# Patient Record
Sex: MALE | Race: WHITE | NOT HISPANIC OR LATINO | Employment: FULL TIME | URBAN - NONMETROPOLITAN AREA
[De-identification: names, ages, dates, MRNs, and addresses within clinical notes are randomized per-mention and may not be internally consistent; named-entity substitution may affect disease eponyms.]

---

## 2023-03-29 PROBLEM — B35.6 TINEA CRURIS: Status: ACTIVE | Noted: 2023-03-29

## 2023-03-29 PROBLEM — L72.3 SEBACEOUS CYST: Status: ACTIVE | Noted: 2023-03-29

## 2023-03-29 PROBLEM — L60.0 INGROWN RIGHT BIG TOENAIL: Status: ACTIVE | Noted: 2023-03-29

## 2023-03-31 ENCOUNTER — OFFICE VISIT (OUTPATIENT)
Dept: PRIMARY CARE | Facility: CLINIC | Age: 33
End: 2023-03-31
Payer: COMMERCIAL

## 2023-03-31 VITALS
SYSTOLIC BLOOD PRESSURE: 136 MMHG | OXYGEN SATURATION: 98 % | HEART RATE: 80 BPM | BODY MASS INDEX: 26.98 KG/M2 | HEIGHT: 75 IN | WEIGHT: 217 LBS | DIASTOLIC BLOOD PRESSURE: 84 MMHG

## 2023-03-31 DIAGNOSIS — R10.13 DYSPEPSIA: ICD-10-CM

## 2023-03-31 DIAGNOSIS — B07.8 COMMON WART: Primary | ICD-10-CM

## 2023-03-31 PROBLEM — B35.6 TINEA CRURIS: Status: RESOLVED | Noted: 2023-03-29 | Resolved: 2023-03-31

## 2023-03-31 PROBLEM — L60.0 INGROWN RIGHT BIG TOENAIL: Status: RESOLVED | Noted: 2023-03-29 | Resolved: 2023-03-31

## 2023-03-31 PROBLEM — L72.3 SEBACEOUS CYST: Status: RESOLVED | Noted: 2023-03-29 | Resolved: 2023-03-31

## 2023-03-31 PROCEDURE — 17110 DESTRUCTION B9 LES UP TO 14: CPT | Performed by: FAMILY MEDICINE

## 2023-03-31 PROCEDURE — 99214 OFFICE O/P EST MOD 30 MIN: CPT | Performed by: FAMILY MEDICINE

## 2023-03-31 RX ORDER — SIMETHICONE 125 MG
TABLET,CHEWABLE ORAL EVERY 6 HOURS PRN
COMMUNITY
End: 2023-10-19 | Stop reason: WASHOUT

## 2023-03-31 RX ORDER — PANTOPRAZOLE SODIUM 40 MG/1
40 TABLET, DELAYED RELEASE ORAL DAILY
Qty: 30 TABLET | Refills: 5 | Status: SHIPPED | OUTPATIENT
Start: 2023-03-31 | End: 2023-10-19 | Stop reason: SDUPTHER

## 2023-03-31 NOTE — PROGRESS NOTES
"Subjective   Patient ID: Nikolay Ames is a 33 y.o. male who presents for GERD (X 2 weeks. States he feels his throat has been swelling and pressure in the chest) and Toe Pain (R pinky toe- warts).    HPI has developed warts on the right foot plantar fifth digit with aggressive home care persisting    Dyspepsia for the last month has had soreness in the throat a lot of eructation and regurgitation.  No dysphagia odynophagia.  No weight loss no blood.  Is known to have some allergies with postnasal drip.  Weekend alcohol in the 6-10 range.  Oral nicotine use.  No nonsteroidals.  Large ice coffee daily.    Review of Systems  Negative except as per HPI  Objective   /84 (BP Location: Right arm, Patient Position: Sitting)   Pulse 80   Ht 1.905 m (6' 3\")   Wt 98.4 kg (217 lb)   SpO2 98%   BMI 27.12 kg/m²     Physical Exam  Neck no bruit thyroid nontender heart regular without murmur lungs clear.  Abdomen soft no solid or pulsatile masses mild epigastric tenderness.  Right foot fifth digit 1-1/2 cm cluster of warts on the plantar pad of the fifth digit and 3 warts on the adjoining metatarsal pad. Liquid nitrogen was applied to the lesion to effect a 2-3 mm margin of cryodestruction.  Expected course of healing and wound care instructions were provided  and reviewed.    Assessment/Plan   Problem List Items Addressed This Visit    None  Visit Diagnoses       Common wart    -  Primary    Relevant Orders    Follow Up In Primary Care    Dyspepsia        Relevant Medications    pantoprazole (ProtoNix) 40 mg EC tablet    Other Relevant Orders    Follow Up In Primary Care        6-week follow-up to evaluate warts and dyspepsia       "

## 2023-05-12 ENCOUNTER — TELEPHONE (OUTPATIENT)
Dept: PRIMARY CARE | Facility: CLINIC | Age: 33
End: 2023-05-12

## 2023-05-12 ENCOUNTER — OFFICE VISIT (OUTPATIENT)
Dept: PRIMARY CARE | Facility: CLINIC | Age: 33
End: 2023-05-12
Payer: COMMERCIAL

## 2023-05-12 VITALS
DIASTOLIC BLOOD PRESSURE: 80 MMHG | HEIGHT: 75 IN | OXYGEN SATURATION: 98 % | BODY MASS INDEX: 27.09 KG/M2 | WEIGHT: 217.9 LBS | HEART RATE: 63 BPM | SYSTOLIC BLOOD PRESSURE: 110 MMHG

## 2023-05-12 DIAGNOSIS — B07.8 COMMON WART: ICD-10-CM

## 2023-05-12 DIAGNOSIS — R10.13 DYSPEPSIA: ICD-10-CM

## 2023-05-12 PROBLEM — K21.9 GASTROESOPHAGEAL REFLUX DISEASE WITHOUT ESOPHAGITIS: Status: ACTIVE | Noted: 2023-05-12

## 2023-05-12 PROCEDURE — 99213 OFFICE O/P EST LOW 20 MIN: CPT | Performed by: FAMILY MEDICINE

## 2023-05-12 RX ORDER — SUCRALFATE 1 G/1
1 TABLET ORAL
Qty: 56 TABLET | Refills: 0 | Status: SHIPPED | OUTPATIENT
Start: 2023-05-12 | End: 2023-05-26

## 2023-05-12 ASSESSMENT — ENCOUNTER SYMPTOMS
CONSTIPATION: 0
APPETITE CHANGE: 0
SHORTNESS OF BREATH: 0
ABDOMINAL PAIN: 0
PALPITATIONS: 0
ACTIVITY CHANGE: 0
COUGH: 0
SORE THROAT: 1
CHEST TIGHTNESS: 0
VOMITING: 0
FATIGUE: 0
NAUSEA: 0
DIARRHEA: 0

## 2023-05-12 NOTE — PROGRESS NOTES
"Subjective   Patient ID: Nikolay Ames is a 33 y.o. male who presents for 6 wk fu.    HPI   Seen MDS 6 weeks ago for GERD, placed on PPI (has helped), triggers with food and ETOH (better with adjustment), some feeling of dysphagia at times, dry throat and sore throat at times  Some coughing    Review of Systems   Constitutional:  Negative for activity change, appetite change and fatigue.   HENT:  Positive for sore throat.    Respiratory:  Negative for cough, chest tightness and shortness of breath.    Cardiovascular:  Negative for chest pain, palpitations and leg swelling.   Gastrointestinal:  Negative for abdominal pain, constipation, diarrhea, nausea and vomiting.       Objective   /80   Pulse 63   Ht 1.905 m (6' 3\")   Wt 98.8 kg (217 lb 14.4 oz)   SpO2 98%   BMI 27.24 kg/m²     Physical Exam  Vitals and nursing note reviewed.   Constitutional:       Appearance: Normal appearance.   Cardiovascular:      Rate and Rhythm: Normal rate and regular rhythm.      Pulses: Normal pulses.      Heart sounds: Normal heart sounds.   Pulmonary:      Effort: Pulmonary effort is normal.      Breath sounds: Normal breath sounds.   Abdominal:      General: Abdomen is flat. Bowel sounds are normal.      Palpations: Abdomen is soft.   Neurological:      Mental Status: He is alert.   Psychiatric:         Mood and Affect: Mood normal.         Behavior: Behavior normal.         Assessment/Plan   Problem List Items Addressed This Visit    None  Visit Diagnoses       Common wart        Treated with cryo with surgery today, if still persist try Renuka.    Dyspepsia        Relevant Medications    sucralfate (Carafate) 1 gram tablet    Other Relevant Orders    Referral to Gastroenterology               "

## 2023-05-12 NOTE — ASSESSMENT & PLAN NOTE
Patient tolerating PPI with some improvement especially with diet changes, still having some intermittent dyspepsia on medication, and some dysphagia in the throat area.  Placed on Carafate for the next 2 weeks, refer to GI for EGD.

## 2023-06-29 ENCOUNTER — TELEPHONE (OUTPATIENT)
Dept: PRIMARY CARE | Facility: CLINIC | Age: 33
End: 2023-06-29
Payer: COMMERCIAL

## 2023-06-29 NOTE — TELEPHONE ENCOUNTER
PATIENT SAYS YOU MENTIONED TOPICAL MED FOR WARTS ON TOES.    WOULD LIKE TO TRY IT.   FIDEL CASTILLO

## 2023-07-03 DIAGNOSIS — B07.8 COMMON WART: Primary | ICD-10-CM

## 2023-07-03 RX ORDER — IMIQUIMOD 12.5 MG/.25G
1 CREAM TOPICAL 3 TIMES WEEKLY
Qty: 12 PACKET | Refills: 11 | Status: SHIPPED | OUTPATIENT
Start: 2023-07-03 | End: 2023-10-19 | Stop reason: WASHOUT

## 2023-07-25 ENCOUNTER — HOSPITAL ENCOUNTER (OUTPATIENT)
Dept: DATA CONVERSION | Facility: HOSPITAL | Age: 33
End: 2023-07-25
Attending: INTERNAL MEDICINE | Admitting: INTERNAL MEDICINE
Payer: COMMERCIAL

## 2023-07-25 DIAGNOSIS — K22.89 OTHER SPECIFIED DISEASE OF ESOPHAGUS: ICD-10-CM

## 2023-07-25 DIAGNOSIS — R13.10 DYSPHAGIA, UNSPECIFIED: ICD-10-CM

## 2023-07-25 DIAGNOSIS — R13.19 OTHER DYSPHAGIA: ICD-10-CM

## 2023-07-25 DIAGNOSIS — K30 FUNCTIONAL DYSPEPSIA: ICD-10-CM

## 2023-08-01 LAB
COMPLETE PATHOLOGY REPORT: NORMAL
CONVERTED CLINICAL DIAGNOSIS-HISTORY: NORMAL
CONVERTED FINAL DIAGNOSIS: NORMAL
CONVERTED FINAL REPORT PDF LINK TO COPY AND PASTE: NORMAL
CONVERTED GROSS DESCRIPTION: NORMAL

## 2023-10-19 ENCOUNTER — OFFICE VISIT (OUTPATIENT)
Dept: GASTROENTEROLOGY | Facility: CLINIC | Age: 33
End: 2023-10-19
Payer: COMMERCIAL

## 2023-10-19 VITALS — BODY MASS INDEX: 26.24 KG/M2 | WEIGHT: 211 LBS | HEIGHT: 75 IN

## 2023-10-19 DIAGNOSIS — K21.9 GASTROESOPHAGEAL REFLUX DISEASE WITHOUT ESOPHAGITIS: Primary | ICD-10-CM

## 2023-10-19 DIAGNOSIS — R10.13 DYSPEPSIA: ICD-10-CM

## 2023-10-19 PROCEDURE — 99213 OFFICE O/P EST LOW 20 MIN: CPT | Performed by: INTERNAL MEDICINE

## 2023-10-19 RX ORDER — PANTOPRAZOLE SODIUM 40 MG/1
40 TABLET, DELAYED RELEASE ORAL DAILY
Qty: 30 TABLET | Refills: 11 | Status: SHIPPED | OUTPATIENT
Start: 2023-10-19 | End: 2024-03-26 | Stop reason: WASHOUT

## 2023-10-19 ASSESSMENT — ENCOUNTER SYMPTOMS
ROS GI COMMENTS: SEE HPI
TROUBLE SWALLOWING: 0
UNEXPECTED WEIGHT CHANGE: 0
FEVER: 0
CHILLS: 0
ARTHRALGIAS: 0
SHORTNESS OF BREATH: 0
COUGH: 0

## 2023-10-19 NOTE — PROGRESS NOTES
Subjective   Patient ID: Nikolay Ames is a 33 y.o. male who presents for Follow-up (FUV from EGD to discuss poss EOE).  HPI Rik is seen today in follow-up.  Upper endoscopy was performed for symptomatic dysphagia biopsies were unremarkable.  Did feel better while on Prilosec, omeprazole or Nexium recently.  Since stopping therapy has had some stuttering episodes of dysphagia.  Worse if he eats acidic foods or drinks beer.  He denies any other complaints    Review of Systems   Constitutional:  Negative for chills, fever and unexpected weight change.   HENT:  Negative for mouth sores and trouble swallowing.    Respiratory:  Negative for cough and shortness of breath.    Cardiovascular:  Negative for chest pain and leg swelling.   Gastrointestinal:         See HPI   Musculoskeletal:  Negative for arthralgias.   Skin:  Negative for rash.       Objective   Physical Exam  Vitals reviewed.   Constitutional:       General: He is awake.      Appearance: Normal appearance.   HENT:      Head: Normocephalic.      Mouth/Throat:      Mouth: Mucous membranes are moist.      Pharynx: Oropharynx is clear.   Eyes:      Conjunctiva/sclera: Conjunctivae normal.      Pupils: Pupils are equal, round, and reactive to light.   Cardiovascular:      Rate and Rhythm: Normal rate and regular rhythm.      Heart sounds: Normal heart sounds.      Comments: No overt chest pain  Pulmonary:      Effort: Pulmonary effort is normal.      Breath sounds: Normal breath sounds.   Abdominal:      General: Abdomen is flat. Bowel sounds are normal.      Palpations: Abdomen is soft.      Tenderness: There is no abdominal tenderness.   Musculoskeletal:         General: Normal range of motion.      Cervical back: Normal range of motion and neck supple.   Skin:     General: Skin is warm and dry.   Neurological:      Mental Status: He is alert and oriented to person, place, and time.   Psychiatric:         Attention and Perception: Attention and perception  normal.         Behavior: Behavior normal.         Assessment/Plan   Problem List Items Addressed This Visit             ICD-10-CM    Gastroesophageal reflux disease without esophagitis - Primary K21.9    Relevant Orders    Follow Up In Gastroenterology     Other Visit Diagnoses         Codes    Dyspepsia     R10.13    Relevant Medications    pantoprazole (ProtoNix) 40 mg EC tablet    Other Relevant Orders    Follow Up In Gastroenterology

## 2023-12-08 ENCOUNTER — OFFICE VISIT (OUTPATIENT)
Dept: PRIMARY CARE | Facility: CLINIC | Age: 33
End: 2023-12-08
Payer: COMMERCIAL

## 2023-12-08 VITALS
BODY MASS INDEX: 26.3 KG/M2 | DIASTOLIC BLOOD PRESSURE: 80 MMHG | OXYGEN SATURATION: 96 % | HEIGHT: 75 IN | WEIGHT: 211.5 LBS | SYSTOLIC BLOOD PRESSURE: 120 MMHG | HEART RATE: 85 BPM

## 2023-12-08 DIAGNOSIS — B35.6 TINEA CRURIS: Primary | ICD-10-CM

## 2023-12-08 PROCEDURE — 99213 OFFICE O/P EST LOW 20 MIN: CPT | Performed by: FAMILY MEDICINE

## 2023-12-08 RX ORDER — NYSTATIN AND TRIAMCINOLONE ACETONIDE 100000; 1 [USP'U]/G; MG/G
CREAM TOPICAL 2 TIMES DAILY
Qty: 30 G | Refills: 1 | Status: SHIPPED | OUTPATIENT
Start: 2023-12-08 | End: 2024-03-26 | Stop reason: WASHOUT

## 2023-12-08 ASSESSMENT — ENCOUNTER SYMPTOMS
CONSTIPATION: 0
NAUSEA: 0
FATIGUE: 0
DIARRHEA: 0
SHORTNESS OF BREATH: 0
VOMITING: 0
PALPITATIONS: 0
ACTIVITY CHANGE: 0
APPETITE CHANGE: 0
COUGH: 0
ABDOMINAL PAIN: 0
CHEST TIGHTNESS: 0

## 2023-12-08 NOTE — PROGRESS NOTES
"Subjective   Patient ID: Nikolay Ames is a 33 y.o. male who presents for RASH GROIN (X1mo/).    HPI   Rash in groin, some itching, been there for a month, tried OTC miconazole cream (some   Help), no pain  Review of Systems   Constitutional:  Negative for activity change, appetite change and fatigue.   Respiratory:  Negative for cough, chest tightness and shortness of breath.    Cardiovascular:  Negative for chest pain, palpitations and leg swelling.   Gastrointestinal:  Negative for abdominal pain, constipation, diarrhea, nausea and vomiting.   Skin:  Positive for rash.       Objective   /80   Pulse 85   Ht 1.905 m (6' 3\")   Wt 95.9 kg (211 lb 8 oz)   SpO2 96%   BMI 26.44 kg/m²     Physical Exam  Vitals and nursing note reviewed.   Constitutional:       Appearance: Normal appearance. He is normal weight.   Skin:     Comments: Some erythema and macular raised area along the posterior aspect of the scrotum extending to the perineum.  No scaling, no skin breakdown, nontender to palpation.   Neurological:      Mental Status: He is alert.         Assessment/Plan   Problem List Items Addressed This Visit             ICD-10-CM    Tinea cruris - Primary B35.6     Try Mycolog cream for the next 10 days, call if not improving in the next 14 days.         Relevant Medications    nystatin-triamcinolone (Mycolog II) cream          "

## 2024-03-26 ENCOUNTER — OFFICE VISIT (OUTPATIENT)
Dept: PRIMARY CARE | Facility: CLINIC | Age: 34
End: 2024-03-26
Payer: COMMERCIAL

## 2024-03-26 VITALS
WEIGHT: 217 LBS | HEART RATE: 82 BPM | SYSTOLIC BLOOD PRESSURE: 130 MMHG | HEIGHT: 75 IN | DIASTOLIC BLOOD PRESSURE: 90 MMHG | OXYGEN SATURATION: 98 % | BODY MASS INDEX: 26.98 KG/M2

## 2024-03-26 DIAGNOSIS — R36.9 PENILE DISCHARGE: Primary | ICD-10-CM

## 2024-03-26 PROCEDURE — 99213 OFFICE O/P EST LOW 20 MIN: CPT | Performed by: FAMILY MEDICINE

## 2024-03-26 PROCEDURE — 87800 DETECT AGNT MULT DNA DIREC: CPT

## 2024-03-26 RX ORDER — DOXYCYCLINE 100 MG/1
100 CAPSULE ORAL 2 TIMES DAILY
Qty: 14 CAPSULE | Refills: 0 | Status: SHIPPED | OUTPATIENT
Start: 2024-03-26 | End: 2024-04-02

## 2024-03-26 ASSESSMENT — ENCOUNTER SYMPTOMS
PALPITATIONS: 0
CONSTIPATION: 0
SHORTNESS OF BREATH: 0
ABDOMINAL PAIN: 0
VOMITING: 0
CHEST TIGHTNESS: 0
COUGH: 0
ACTIVITY CHANGE: 0
APPETITE CHANGE: 0
DYSURIA: 1
NAUSEA: 0
DIARRHEA: 0
FATIGUE: 0

## 2024-03-26 NOTE — PROGRESS NOTES
"Subjective   Patient ID: Nikolay Ames is a 34 y.o. male who presents for Milky liquid coming from Penis.    HPI   1 week with urge to urinate, 2-3 days with discharge from penis, some urge to urinate, no N/V or F/C  Sexually active, no rash, some redness at urethra.     Review of Systems   Constitutional:  Negative for activity change, appetite change and fatigue.   Respiratory:  Negative for cough, chest tightness and shortness of breath.    Cardiovascular:  Negative for chest pain, palpitations and leg swelling.   Gastrointestinal:  Negative for abdominal pain, constipation, diarrhea, nausea and vomiting.   Genitourinary:  Positive for dysuria, penile discharge and urgency. Negative for penile pain, penile swelling, scrotal swelling and testicular pain.       Objective   /90   Pulse 82   Ht 1.905 m (6' 3\")   Wt 98.4 kg (217 lb)   SpO2 98%   BMI 27.12 kg/m²     Physical Exam  Vitals and nursing note reviewed.   Constitutional:       Appearance: Normal appearance.   HENT:      Head: Normocephalic and atraumatic.      Right Ear: Tympanic membrane, ear canal and external ear normal.      Left Ear: Tympanic membrane, ear canal and external ear normal.      Nose: Nose normal.      Mouth/Throat:      Mouth: Mucous membranes are moist.      Pharynx: Oropharynx is clear.   Cardiovascular:      Rate and Rhythm: Normal rate and regular rhythm.      Pulses: Normal pulses.      Heart sounds: Normal heart sounds.   Pulmonary:      Effort: Pulmonary effort is normal.      Breath sounds: Normal breath sounds.   Genitourinary:     Penis: Normal.       Testes: Normal.   Musculoskeletal:      Cervical back: Normal range of motion and neck supple.   Neurological:      Mental Status: He is alert.   Psychiatric:         Mood and Affect: Mood normal.         Behavior: Behavior normal.         Assessment/Plan   Problem List Items Addressed This Visit    None  Visit Diagnoses         Codes    Penile discharge    -  Primary " R36.9    Check urinalysis for GC and chlamydia, placed on doxycycline 100 mg 1 p.o. twice daily x 7 days.  Patient's partner is already aware of testing needed.     Relevant Medications    doxycycline (Vibramycin) 100 mg capsule    Other Relevant Orders    C. trachomatis / N. gonorrhoeae, DNA probe

## 2024-03-27 LAB
C TRACH RRNA SPEC QL NAA+PROBE: NEGATIVE
N GONORRHOEA DNA SPEC QL PROBE+SIG AMP: NEGATIVE

## 2024-03-28 NOTE — RESULT ENCOUNTER NOTE
Please let the patient know that his tests were negative (no chlamydia or gonorrhea). I'd finish the antibiotics though and if the issue does not resolve, call

## 2024-03-29 ENCOUNTER — TELEPHONE (OUTPATIENT)
Dept: PRIMARY CARE | Facility: CLINIC | Age: 34
End: 2024-03-29
Payer: COMMERCIAL

## 2024-03-29 NOTE — TELEPHONE ENCOUNTER
----- Message from Shade Jones MD sent at 3/28/2024  7:55 AM EDT -----  Please let the patient know that his tests were negative (no chlamydia or gonorrhea). I'd finish the antibiotics though and if the issue does not resolve, call

## 2024-04-25 ENCOUNTER — OFFICE VISIT (OUTPATIENT)
Dept: PRIMARY CARE | Facility: CLINIC | Age: 34
End: 2024-04-25
Payer: COMMERCIAL

## 2024-04-25 VITALS
BODY MASS INDEX: 27.3 KG/M2 | SYSTOLIC BLOOD PRESSURE: 124 MMHG | WEIGHT: 219.6 LBS | HEART RATE: 71 BPM | HEIGHT: 75 IN | OXYGEN SATURATION: 97 % | DIASTOLIC BLOOD PRESSURE: 80 MMHG

## 2024-04-25 DIAGNOSIS — R36.9 PENILE DISCHARGE: ICD-10-CM

## 2024-04-25 LAB
POC APPEARANCE, URINE: CLEAR
POC BILIRUBIN, URINE: NEGATIVE
POC BLOOD, URINE: NEGATIVE
POC COLOR, URINE: ABNORMAL
POC GLUCOSE, URINE: NEGATIVE MG/DL
POC KETONES, URINE: NEGATIVE MG/DL
POC LEUKOCYTES, URINE: ABNORMAL
POC NITRITE,URINE: NEGATIVE
POC PH, URINE: 6.5 PH
POC PROTEIN, URINE: NEGATIVE MG/DL
POC SPECIFIC GRAVITY, URINE: 1.02
POC UROBILINOGEN, URINE: 0.2 EU/DL

## 2024-04-25 PROCEDURE — 1036F TOBACCO NON-USER: CPT | Performed by: FAMILY MEDICINE

## 2024-04-25 PROCEDURE — 99213 OFFICE O/P EST LOW 20 MIN: CPT | Performed by: FAMILY MEDICINE

## 2024-04-25 PROCEDURE — 81003 URINALYSIS AUTO W/O SCOPE: CPT | Performed by: FAMILY MEDICINE

## 2024-04-25 RX ORDER — DOXYCYCLINE 100 MG/1
100 CAPSULE ORAL 2 TIMES DAILY
Qty: 14 CAPSULE | Refills: 1 | Status: SHIPPED | OUTPATIENT
Start: 2024-04-25 | End: 2024-05-02

## 2024-04-25 NOTE — PROGRESS NOTES
"Subjective   Patient ID: Nikolay Ames is a 34 y.o. male who presents for return of urinary symptoms.    HPI having relapse with him mild distal urethral discomfort and slight discharge.  Mutually monogamous, partner also evaluated and treated concurrently with his previous treatment.  Reviewed JEROD/Ureaplasma and such.  Urinalysis shows only leukocytes no blood protein or glucose.  Plan at this time to retreat with doxycycline and if relapse or recur consider urology but would expect symptoms if Ureaplasma to resolve over the next month  Did yard work and has some lower thoracic upper lumbar right paraspinal muscle pain worse with twisting.  Do not feel related to  symptoms  Review of Systems  No fever chills no systemic symptoms  Objective   /80   Pulse 71   Ht 1.905 m (6' 3\")   Wt 99.6 kg (219 lb 9.6 oz)   SpO2 97%   BMI 27.45 kg/m²     Physical Exam  Heart regular lungs clear no CVA tenderness no suprapubic tenderness.  Mild tenderness over lower thoracic upper lumbar right paraspinal muscles.  No rash.  Assessment/Plan   Problem List Items Addressed This Visit    None  Visit Diagnoses         Codes    Penile discharge     R36.9    Relevant Medications    doxycycline (Vibramycin) 100 mg capsule    Other Relevant Orders    POCT UA Automated manually resulted (Completed)               "

## 2024-09-06 ENCOUNTER — TELEPHONE (OUTPATIENT)
Dept: PRIMARY CARE | Facility: CLINIC | Age: 34
End: 2024-09-06
Payer: COMMERCIAL

## 2024-09-06 DIAGNOSIS — R36.9 PENILE DISCHARGE: Primary | ICD-10-CM

## 2024-09-06 RX ORDER — DOXYCYCLINE 100 MG/1
100 CAPSULE ORAL 2 TIMES DAILY
Qty: 14 CAPSULE | Refills: 0 | Status: SHIPPED | OUTPATIENT
Start: 2024-09-06 | End: 2024-09-13

## 2024-09-06 NOTE — TELEPHONE ENCOUNTER
PATIENT SEEN BACK IN APRIL FOR DISCHARGE AND IRRITATION FROM [facility].       NOW HAVING SAME SYMPTOMS.    CANNOT GET INTO UROLOGY  UNTIL 11-27-24.   WOULD  LIKE REFILL ON DOXY, PLEASE.   [employer]

## 2024-11-27 ENCOUNTER — APPOINTMENT (OUTPATIENT)
Dept: UROLOGY | Facility: CLINIC | Age: 34
End: 2024-11-27
Payer: COMMERCIAL

## 2025-01-02 ENCOUNTER — TELEPHONE (OUTPATIENT)
Age: 35
End: 2025-01-02
Payer: COMMERCIAL

## 2025-01-02 DIAGNOSIS — R36.9 PENILE DISCHARGE: Primary | ICD-10-CM

## 2025-01-02 RX ORDER — DOXYCYCLINE 100 MG/1
100 CAPSULE ORAL 2 TIMES DAILY
Qty: 14 CAPSULE | Refills: 0 | Status: SHIPPED | OUTPATIENT
Start: 2025-01-02 | End: 2025-01-09

## 2025-01-02 NOTE — TELEPHONE ENCOUNTER
PATIENT WAS DICKSON WITH UROLOGY  AND IT WAS CANCELLED BY DR HAY';S  OFFICE.   NOW SUAD FOR 4-2025.   PATIENT IS GOING TO CALL, TO TRY AND MOVIE APPT UP.   HAVING DISCHARGE AGAIN FROM END OF [facility].      WOULD LIKE  REFILL  ON DOXYCYCLINE.    PLEASE FIDEL CASTILLO

## 2025-01-02 NOTE — TELEPHONE ENCOUNTER
Will renew his medicine for him, we may need to consider having him see a different urology office if there is an availability issue.

## 2025-02-04 ENCOUNTER — TELEPHONE (OUTPATIENT)
Dept: URGENT CARE | Facility: CLINIC | Age: 35
End: 2025-02-04

## 2025-02-04 ENCOUNTER — OFFICE VISIT (OUTPATIENT)
Dept: URGENT CARE | Facility: CLINIC | Age: 35
End: 2025-02-04
Payer: COMMERCIAL

## 2025-02-04 VITALS
DIASTOLIC BLOOD PRESSURE: 91 MMHG | TEMPERATURE: 98 F | HEART RATE: 56 BPM | SYSTOLIC BLOOD PRESSURE: 137 MMHG | OXYGEN SATURATION: 96 % | HEIGHT: 76 IN | BODY MASS INDEX: 24.96 KG/M2 | WEIGHT: 205 LBS | RESPIRATION RATE: 16 BRPM

## 2025-02-04 DIAGNOSIS — J20.9 ACUTE BRONCHITIS, UNSPECIFIED ORGANISM: Primary | ICD-10-CM

## 2025-02-04 LAB
POC CORONAVIRUS 2019 BY PCR (COV19): NOT DETECTED
POC FLU A RESULT: NOT DETECTED
POC FLU B RESULT: NOT DETECTED
POC RSV PCR: NOT DETECTED

## 2025-02-04 PROCEDURE — 99213 OFFICE O/P EST LOW 20 MIN: CPT | Performed by: NURSE PRACTITIONER

## 2025-02-04 RX ORDER — PREDNISONE 10 MG/1
TABLET ORAL
Qty: 21 TABLET | Refills: 0 | Status: SHIPPED | OUTPATIENT
Start: 2025-02-04 | End: 2025-02-10

## 2025-02-04 RX ORDER — AZITHROMYCIN 250 MG/1
TABLET, FILM COATED ORAL
Qty: 6 TABLET | Refills: 0 | Status: SHIPPED | OUTPATIENT
Start: 2025-02-04 | End: 2025-02-09

## 2025-02-04 RX ORDER — INHALER, ASSIST DEVICES
SPACER (EA) MISCELLANEOUS
Qty: 1 EACH | Refills: 0 | Status: SHIPPED | OUTPATIENT
Start: 2025-02-04 | End: 2026-02-04

## 2025-02-04 RX ORDER — ALBUTEROL SULFATE 90 UG/1
2 INHALANT RESPIRATORY (INHALATION) EVERY 6 HOURS PRN
Qty: 18 G | Refills: 0 | Status: SHIPPED | OUTPATIENT
Start: 2025-02-04 | End: 2026-02-04

## 2025-02-04 NOTE — TELEPHONE ENCOUNTER
Result Communication    Resulted Orders   POCT SARS-COV-2/FLU/RSV PCR SYMPTOMATIC manually resulted   Result Value Ref Range    POC Coronavirus 2019, PCR Not Detected Not Detected    POC Flu A Result Not Detected Not Detected    POC Flu B Result Not Detected Not Detected    POC RSV PCR Not Detected Not Detected       1:03 PM      Results were successfully communicated with the patient and they acknowledged their understanding.

## 2025-02-04 NOTE — PROGRESS NOTES
35 y.o. male presents for evaluation of URI.  Symptoms including cough, congestion, body aches, malaise, and headache have been present for 7-8 days and refractory to OTC meds.  No fever, chills, nausea, vomiting, abdominal pain, CP, or SOB.  No exacerbating factors. No known COVID 19/flu exposure.      Vitals:    02/04/25 1209   BP: (!) 137/91   Pulse: 56   Resp: 16   Temp: 36.7 °C (98 °F)   SpO2: 96%       Allergies   Allergen Reactions    Codeine Anaphylaxis       Medication Documentation Review Audit       Reviewed by Eli Cotter MA (Medical Assistant) on 02/04/25 at 1208      Medication Order Taking? Sig Documenting Provider Last Dose Status            No Medications to Display                                   History reviewed. No pertinent past medical history.    Past Surgical History:   Procedure Laterality Date    OTHER SURGICAL HISTORY  02/08/2021    Elbow surgery       ROS  See HPI    Physical Exam  Vitals and nursing note reviewed.   Constitutional:       General: He is not in acute distress.     Appearance: Normal appearance. He is not ill-appearing or toxic-appearing.   HENT:      Head: Normocephalic and atraumatic.      Right Ear: Tympanic membrane, ear canal and external ear normal.      Left Ear: Tympanic membrane, ear canal and external ear normal.      Nose: Congestion present.      Mouth/Throat:      Mouth: Mucous membranes are moist.      Pharynx: Oropharynx is clear.   Eyes:      Extraocular Movements: Extraocular movements intact.      Conjunctiva/sclera: Conjunctivae normal.      Pupils: Pupils are equal, round, and reactive to light.   Cardiovascular:      Rate and Rhythm: Normal rate and regular rhythm.      Pulses: Normal pulses.      Heart sounds: Normal heart sounds.   Pulmonary:      Effort: Pulmonary effort is normal. No respiratory distress.      Breath sounds: No stridor. Rhonchi present. No wheezing or rales.   Chest:      Chest wall: No tenderness.   Lymphadenopathy:       Cervical: No cervical adenopathy.   Skin:     General: Skin is warm.      Capillary Refill: Capillary refill takes less than 2 seconds.   Neurological:      General: No focal deficit present.      Mental Status: He is alert and oriented to person, place, and time.   Psychiatric:         Mood and Affect: Mood normal.         Behavior: Behavior normal.           Assessment/Plan/MDM  Nikolay was seen today for uri.  Diagnoses and all orders for this visit:  Acute bronchitis, unspecified organism (Primary)  -     azithromycin (Zithromax Z-Godwin) 250 mg tablet; Take 2 tablets (500 mg) on  Day 1,  followed by 1 tablet (250 mg) once daily on Days 2 through 5.  -     predniSONE (Deltasone) 10 mg tablet; Take 6 tablets (60 mg) by mouth once daily for 1 day, THEN 5 tablets (50 mg) once daily for 1 day, THEN 4 tablets (40 mg) once daily for 1 day, THEN 3 tablets (30 mg) once daily for 1 day, THEN 2 tablets (20 mg) once daily for 1 day, THEN 1 tablet (10 mg) once daily for 1 day.  -     albuterol 90 mcg/actuation inhaler; Inhale 2 puffs every 6 hours if needed for wheezing.  -     inhalational spacing device (Aerochamber MV) inhaler; Use as instructed  -     POCT SARS-COV-2/FLU/RSV PCR SYMPTOMATIC manually resulted    Encouraged pt to use otc cold remedies PRN, push PO fluids and rest. Patient's clinical presentation is otherwise unremarkable at this time. Patient is discharged with instructions to follow-up with primary care or seek emergency medical attention for worsening symptoms or any new concerns.    I did personally review Nikolay's past medical history, surgical history, social history, as well as family history (when relevant).  In this case, I also oversaw the his drug management by reviewing his medication list, allergy list, as well as the medications that I prescribed during the UC course and/or recommended as an out-patient (including possible OTC medications such as acetaminophen, NSAIDs , etc).    After reviewing the  items above, I did look at previous medical documentation, such as recent hospitalizations, office visits, and/or recent consultations with PCP/specialist.                          SDOH:   Another factor that I considered in Nikolay's care was his Social Determinants of Health (SDOH). During this UC encounter, he did not have social determinants of health. Those SDOH influencing Nikolay's care are: none      Herman Martin CNP  [facility]  [phone]

## 2025-09-09 ENCOUNTER — APPOINTMENT (OUTPATIENT)
Age: 35
End: 2025-09-09
Payer: COMMERCIAL